# Patient Record
Sex: MALE | Race: WHITE | NOT HISPANIC OR LATINO
[De-identification: names, ages, dates, MRNs, and addresses within clinical notes are randomized per-mention and may not be internally consistent; named-entity substitution may affect disease eponyms.]

---

## 2022-08-21 ENCOUNTER — NON-APPOINTMENT (OUTPATIENT)
Age: 62
End: 2022-08-21

## 2024-03-29 PROBLEM — Z00.00 ENCOUNTER FOR PREVENTIVE HEALTH EXAMINATION: Status: ACTIVE | Noted: 2024-03-29

## 2024-04-02 ENCOUNTER — APPOINTMENT (OUTPATIENT)
Dept: ORTHOPEDIC SURGERY | Facility: CLINIC | Age: 64
End: 2024-04-02
Payer: COMMERCIAL

## 2024-04-02 VITALS — HEIGHT: 66 IN | BODY MASS INDEX: 28.61 KG/M2 | WEIGHT: 178 LBS

## 2024-04-02 DIAGNOSIS — Z86.79 PERSONAL HISTORY OF OTHER DISEASES OF THE CIRCULATORY SYSTEM: ICD-10-CM

## 2024-04-02 DIAGNOSIS — Z86.39 PERSONAL HISTORY OF OTHER ENDOCRINE, NUTRITIONAL AND METABOLIC DISEASE: ICD-10-CM

## 2024-04-02 DIAGNOSIS — M20.22 HALLUX RIGIDUS, RIGHT FOOT: ICD-10-CM

## 2024-04-02 DIAGNOSIS — Z78.9 OTHER SPECIFIED HEALTH STATUS: ICD-10-CM

## 2024-04-02 DIAGNOSIS — M20.21 HALLUX RIGIDUS, RIGHT FOOT: ICD-10-CM

## 2024-04-02 DIAGNOSIS — Z86.69 PERSONAL HISTORY OF OTHER DISEASES OF THE NERVOUS SYSTEM AND SENSE ORGANS: ICD-10-CM

## 2024-04-02 PROCEDURE — 99205 OFFICE O/P NEW HI 60 MIN: CPT

## 2024-04-02 NOTE — PHYSICAL EXAM
[de-identified] : Examination of the left and right foot and ankle is as follows: INSPECTION: mild swelling of 1st MTP joint/great toe, but no abrasion, no laceration, no erythema, no ecchymosis, no gross deformity PALPATION: 1st MTP joint tenderness  ROM: MTP joint DF 20 degrees, MTP joint PF 10 degrees, but dorsiflexion 20 degrees, plantar flexion 40 degrees, inversion 30 degrees, eversion 20 degrees. STRENGTH: dorsiflexion 5/5. 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

## 2024-04-02 NOTE — HISTORY OF PRESENT ILLNESS
[Sudden] : sudden [8] : 8 [Burning] : burning [3] : 3 [Dull/Aching] : dull/aching [Sharp] : sharp [Stabbing] : stabbing [Throbbing] : throbbing [Household chores] : household chores [Intermittent] : intermittent [Social interactions] : social interactions [Leisure] : leisure [Rest] : rest [Full time] : Work status: full time [de-identified] : Patient here for bilat feet. Patient states left foot is worse than right. Patient states NKI. Patient states he had SX 20 years ago on the great toes by Dr. Jorge. Patient states he has occasional sharp, dull, aching and throbbing pain. Patient joaquin CD with x-ray from [place]. Patient states he started have pain progressively 2 years ago. [] : no [FreeTextEntry3] : 2 years ago  [FreeTextEntry1] : Bilat feet  [de-identified] : Movement  [FreeTextEntry5] : Patient states NKI [de-identified] :

## 2024-04-02 NOTE — ASSESSMENT
[FreeTextEntry1] : 64 yo male presenting with advanced bilateral hallux rigidus. Patient reporting left foot pain is worse than the right. -Discussed with patient risks, benefits, alternatives of left 1st MTP joint arthrodesis -The patient was advised of the diagnosis.  The natural history of the pathology was explained in full to the patient. All questions were answered.  The risks and benefits of surgical and non-surgical treatment were explained in full to the patient.  The patient demonstrated a full understanding of the surgical and non-surgical options.  The risks of surgery were outlined in full to the patient including but not limited to pain, stiffness, bleeding, scarring, infection, neurologic injury, vascular injury, failure to resolve symptoms, symptom recurrence, the need for further surgery, non-healing, wound breakdown, deep vein thrombosis, pulmonary embolism, anesthesia complications and even death.  The patient understood all the risks and accepted them and understood that other complications could occur that are not mentioned above.  The intraoperative plan, post-operative plan, post-operative expectations and limitations were explained in full.  Importance of postoperative rehabilitation and restriction compliance was explained and emphasized. Expectations from non-surgical treatment were explained in full as well.  The patient demonstrated a complete understanding of the treatment alternatives and wished to proceed with surgery.  This will be scheduled accordingly. -Activities as tolerated -Rest, ice, compression, elevation, NSAIDs PRN for pain.  -All questions answered -F/u after surgery  The diagnosis was explained in detail. The potential non-surgical and surgical treatments were reviewed. The relative risks and benefits of each option were considered relative to the patients age, activity level, medical history, symptom severity and previously attempted treatments.  The patient was advised to consult with their primary medical provider prior to initiation of any new medications to reduce the risk of adverse effects specific to their long-term home medications and medical history. The risk of gastrointestinal irritation and kidney injury specific to long-term NSAID use was discussed.  Entered by Lalo Lerma PA-C acting as scribe. Dr. Villarreal Attestation The documentation recorded by the scribe, in my presence, accurately reflects the service I, Dr. Villarreal, personally performed, and the decisions made by me with my edits as appropriate.

## 2024-04-08 RX ORDER — AMLODIPINE BESYLATE 5 MG/1
5 TABLET ORAL
Refills: 0 | Status: ACTIVE | COMMUNITY

## 2024-04-08 RX ORDER — TAMSULOSIN HYDROCHLORIDE 0.4 MG/1
0.4 CAPSULE ORAL
Refills: 0 | Status: ACTIVE | COMMUNITY

## 2024-04-08 RX ORDER — VALSARTAN AND HYDROCHLOROTHIAZIDE 320; 25 MG/1; MG/1
320-25 TABLET, FILM COATED ORAL
Refills: 0 | Status: ACTIVE | COMMUNITY

## 2024-04-08 RX ORDER — EZETIMIBE 10 MG/1
10 TABLET ORAL
Refills: 0 | Status: ACTIVE | COMMUNITY

## 2024-04-08 RX ORDER — ATORVASTATIN CALCIUM 20 MG/1
20 TABLET, FILM COATED ORAL
Refills: 0 | Status: ACTIVE | COMMUNITY

## 2024-04-10 DIAGNOSIS — I38 ENDOCARDITIS, VALVE UNSPECIFIED: ICD-10-CM

## 2024-04-15 ENCOUNTER — OFFICE (OUTPATIENT)
Facility: LOCATION | Age: 64
Setting detail: OPHTHALMOLOGY
End: 2024-04-15
Payer: COMMERCIAL

## 2024-04-15 DIAGNOSIS — H43.811: ICD-10-CM

## 2024-04-15 DIAGNOSIS — H43.391: ICD-10-CM

## 2024-04-15 DIAGNOSIS — H16.223: ICD-10-CM

## 2024-04-15 PROBLEM — H25.13 CATARACT SENILE NUCLEAR SCLEROSIS; BOTH EYES: Status: ACTIVE | Noted: 2024-04-15

## 2024-04-15 PROCEDURE — 92250 FUNDUS PHOTOGRAPHY W/I&R: CPT | Performed by: OPHTHALMOLOGY

## 2024-04-15 PROCEDURE — 92004 COMPRE OPH EXAM NEW PT 1/>: CPT | Performed by: OPHTHALMOLOGY

## 2024-04-16 PROBLEM — H02.83 DERMATOCHALASIS ; BOTH EYES: Status: ACTIVE | Noted: 2024-04-15

## 2024-05-06 ENCOUNTER — OFFICE (OUTPATIENT)
Facility: LOCATION | Age: 64
Setting detail: OPHTHALMOLOGY
End: 2024-05-06
Payer: COMMERCIAL

## 2024-05-06 DIAGNOSIS — H52.4: ICD-10-CM

## 2024-05-06 DIAGNOSIS — D31.31: ICD-10-CM

## 2024-05-06 DIAGNOSIS — H16.223: ICD-10-CM

## 2024-05-06 DIAGNOSIS — H43.811: ICD-10-CM

## 2024-05-06 DIAGNOSIS — H43.391: ICD-10-CM

## 2024-05-06 PROCEDURE — 92250 FUNDUS PHOTOGRAPHY W/I&R: CPT | Performed by: OPHTHALMOLOGY

## 2024-05-06 PROCEDURE — 92015 DETERMINE REFRACTIVE STATE: CPT | Performed by: OPHTHALMOLOGY

## 2024-05-06 PROCEDURE — 99213 OFFICE O/P EST LOW 20 MIN: CPT | Performed by: OPHTHALMOLOGY

## 2024-05-06 ASSESSMENT — CONFRONTATIONAL VISUAL FIELD TEST (CVF)
OS_FINDINGS: FULL
OD_FINDINGS: FULL

## 2024-05-09 PROBLEM — D31.32 CHOROIDAL NEVUS: Status: ACTIVE | Noted: 2024-05-06

## 2024-05-09 PROBLEM — D31.30 CHOROIDAL NEVUS: Status: ACTIVE | Noted: 2024-05-06

## 2024-05-09 PROBLEM — D31.31 CHOROIDAL NEVUS: Status: ACTIVE | Noted: 2024-05-06

## 2024-06-03 ENCOUNTER — APPOINTMENT (OUTPATIENT)
Dept: ORTHOPEDIC SURGERY | Facility: AMBULATORY SURGERY CENTER | Age: 64
End: 2024-06-03
Payer: COMMERCIAL

## 2024-06-03 ENCOUNTER — NON-APPOINTMENT (OUTPATIENT)
Age: 64
End: 2024-06-03

## 2024-06-03 PROCEDURE — 28750 FUSION OF BIG TOE JOINT: CPT | Mod: AS,LT

## 2024-06-03 PROCEDURE — 28750 FUSION OF BIG TOE JOINT: CPT | Mod: LT

## 2024-06-03 RX ORDER — ASPIRIN 325 MG/1
325 TABLET, COATED ORAL
Qty: 28 | Refills: 0 | Status: ACTIVE | COMMUNITY
Start: 2024-06-03 | End: 1900-01-01

## 2024-06-03 RX ORDER — OXYCODONE AND ACETAMINOPHEN 5; 325 MG/1; MG/1
5-325 TABLET ORAL
Qty: 30 | Refills: 0 | Status: ACTIVE | COMMUNITY
Start: 2024-06-03 | End: 1900-01-01

## 2024-06-13 ENCOUNTER — APPOINTMENT (OUTPATIENT)
Dept: ORTHOPEDIC SURGERY | Facility: CLINIC | Age: 64
End: 2024-06-13
Payer: COMMERCIAL

## 2024-06-13 DIAGNOSIS — M20.22 HALLUX RIGIDUS, LEFT FOOT: ICD-10-CM

## 2024-06-13 PROCEDURE — 99024 POSTOP FOLLOW-UP VISIT: CPT

## 2024-06-13 PROCEDURE — 73630 X-RAY EXAM OF FOOT: CPT | Mod: LT

## 2024-06-13 NOTE — HISTORY OF PRESENT ILLNESS
[de-identified] : Patient here for left foot. Patient had first MTP fusion on 6/3/24. Patient denies fever, chills and SOB. Patient denies not taking any pain medication. Patient came into office NWB with knee scooter. Patient states he has no pain at this time.  [Sudden] : sudden [8] : 8 [0] : 0 [Sharp] : sharp [Intermittent] : intermittent [Household chores] : household chores [Leisure] : leisure [Social interactions] : social interactions [Rest] : rest [Full time] : Work status: full time [] : Post Surgical Visit: yes [FreeTextEntry1] : Left foot  [FreeTextEntry3] : 2 years ago  [FreeTextEntry5] : Patient states NKI [de-identified] :   [de-identified] : 6/3/24 [de-identified] : first MTP fusion

## 2024-06-13 NOTE — ASSESSMENT
[FreeTextEntry1] : 62 yo male presenting s/p left 1st MTP joint arthrodesis on 6/3/24. Patient denies fever/chills/CP/SOB. Patient taking Tylenol PRN for pain. X-rays demonstrating well positioned hardware without evidence of failure -LLE WBAT in forefoot offloading shoe -Avoid strenuous/impact related activities -Rest, ice, compression, elevation, NSAIDs PRN for pain.  -All questions answered -F/u a 1 month with repeat x-rays  The diagnosis was explained in detail. The potential non-surgical and surgical treatments were reviewed. The relative risks and benefits of each option were considered relative to the patients age, activity level, medical history, symptom severity and previously attempted treatments.  The patient was advised to consult with their primary medical provider prior to initiation of any new medications to reduce the risk of adverse effects specific to their long-term home medications and medical history. The risk of gastrointestinal irritation and kidney injury specific to long-term NSAID use was discussed.  Entered by Lalo Lerma PA-C acting as scribe. Dr. Villarreal Attestation The documentation recorded by the scribe, in my presence, accurately reflects the service I, Dr. Villarreal, personally performed, and the decisions made by me with my edits as appropriate.

## 2024-06-13 NOTE — PHYSICAL EXAM
[de-identified] : Examination of the left and right foot and ankle is as follows: INSPECTION: sutures removed, steri strips applied, serous and hemorrhagic bullae over proximal 1st phalanx, moderate 1st MTP and dorsal foot swelling, ecchymosis over great and foot, no abrasion, no laceration, no erythema, no ecchymosis, no gross deformity PALPATION: 1st MTP joint tenderness  ROM: MTP joint DF 0 degrees, MTP joint PF 0 degrees, but dorsiflexion 20 degrees, plantar flexion 40 degrees, inversion 30 degrees, eversion 20 degrees. STRENGTH: dorsiflexion 5/5. plantar flexion 5/5, inversion 5/5, eversion 5/5, EHL 3/5, FHL 3/5 VASCULAR: dorsalis pedis pulse: 2+, posterior tibialis pulse: 2+ NEURO: Sensation present to light touch in all distributions GAIT: LLE short leg splint, patient uses knee scooter  X-rays of the left and right foot is as follows: Foot 3 view: s/p 1st MTP joint fusion, hardware is in acceptable position and alignment without evidence of hardware failure

## 2024-07-11 ENCOUNTER — APPOINTMENT (OUTPATIENT)
Dept: ORTHOPEDIC SURGERY | Facility: CLINIC | Age: 64
End: 2024-07-11
Payer: COMMERCIAL

## 2024-07-11 DIAGNOSIS — M20.22 HALLUX RIGIDUS, LEFT FOOT: ICD-10-CM

## 2024-07-11 PROCEDURE — 73630 X-RAY EXAM OF FOOT: CPT | Mod: LT

## 2024-07-11 PROCEDURE — 99024 POSTOP FOLLOW-UP VISIT: CPT

## 2024-08-29 ENCOUNTER — APPOINTMENT (OUTPATIENT)
Dept: ORTHOPEDIC SURGERY | Facility: CLINIC | Age: 64
End: 2024-08-29
Payer: COMMERCIAL

## 2024-08-29 DIAGNOSIS — M20.22 HALLUX RIGIDUS, RIGHT FOOT: ICD-10-CM

## 2024-08-29 DIAGNOSIS — M20.21 HALLUX RIGIDUS, RIGHT FOOT: ICD-10-CM

## 2024-08-29 PROCEDURE — 99024 POSTOP FOLLOW-UP VISIT: CPT

## 2024-08-29 PROCEDURE — 73630 X-RAY EXAM OF FOOT: CPT | Mod: LT

## 2024-08-29 NOTE — PHYSICAL EXAM
[de-identified] : Examination of the left and right foot and ankle is as follows: INSPECTION: dorsal inc well healed, no erythema/warmth/drainage PALPATION: No 1st MTP joint tenderness  ROM: MTP joint DF 0 degrees, MTP joint PF 0 degrees, but dorsiflexion 20 degrees, plantar flexion 40 degrees, inversion 30 degrees, eversion 20 degrees. STRENGTH: dorsiflexion 5/5. plantar flexion 5/5, inversion 5/5, eversion 5/5, EHL 3/5, FHL 3/5 VASCULAR: dorsalis pedis pulse: 2+, posterior tibialis pulse: 2+ NEURO: Sensation present to light touch in all distributions GAIT: normal gait  X-rays of the left and right foot is as follows: Foot 3 view: s/p 1st MTP joint fusion, hardware is in acceptable position and alignment without evidence of hardware failure

## 2024-08-29 NOTE — HISTORY OF PRESENT ILLNESS
[Sudden] : sudden [0] : 0 [Rest] : rest [Full time] : Work status: full time [de-identified] : Patient here for left foot. Patient had first MTP fusion on 6/3/24.  Patient came into office weight bearing in post-op shoe. Patient states he has no pain at this time.  [] : no [FreeTextEntry1] : Left foot  [FreeTextEntry3] : 2 years ago  [FreeTextEntry5] : Patient states NKI [de-identified] :   [de-identified] : 6/3/24 [de-identified] : first MTP fusion

## 2024-08-29 NOTE — ASSESSMENT
[FreeTextEntry1] : 64 yo male presenting s/p left 1st MTP joint arthrodesis on 6/3/24. Patient reports no pain and denies fever/chills/CP/SOB. X-rays demonstrating well positioned hardware without evidence of failure with fusion at 1st mtpj.  Patient reports he is pleased with his surgical outcome.  -activities as tolerated -All questions answered -F/u prn